# Patient Record
Sex: FEMALE | Race: OTHER | HISPANIC OR LATINO | URBAN - METROPOLITAN AREA
[De-identification: names, ages, dates, MRNs, and addresses within clinical notes are randomized per-mention and may not be internally consistent; named-entity substitution may affect disease eponyms.]

---

## 2024-10-13 ENCOUNTER — EMERGENCY (EMERGENCY)
Facility: HOSPITAL | Age: 24
LOS: 1 days | Discharge: HOME | End: 2024-10-13
Attending: EMERGENCY MEDICINE
Payer: SELF-PAY

## 2024-10-13 VITALS
WEIGHT: 182.1 LBS | DIASTOLIC BLOOD PRESSURE: 70 MMHG | TEMPERATURE: 99 F | SYSTOLIC BLOOD PRESSURE: 105 MMHG | HEART RATE: 75 BPM | HEIGHT: 63 IN | RESPIRATION RATE: 19 BRPM | OXYGEN SATURATION: 99 %

## 2024-10-13 VITALS
HEART RATE: 90 BPM | DIASTOLIC BLOOD PRESSURE: 68 MMHG | RESPIRATION RATE: 18 BRPM | TEMPERATURE: 99 F | SYSTOLIC BLOOD PRESSURE: 109 MMHG | OXYGEN SATURATION: 99 %

## 2024-10-13 LAB
ALBUMIN SERPL ELPH-MCNC: 3.2 G/DL — LOW (ref 3.5–5)
ALP SERPL-CCNC: 61 U/L — SIGNIFICANT CHANGE UP (ref 40–120)
ALT FLD-CCNC: 23 U/L DA — SIGNIFICANT CHANGE UP (ref 10–60)
ANION GAP SERPL CALC-SCNC: 8 MMOL/L — SIGNIFICANT CHANGE UP (ref 5–17)
AST SERPL-CCNC: 53 U/L — HIGH (ref 10–40)
BASOPHILS # BLD AUTO: 0.05 K/UL — SIGNIFICANT CHANGE UP (ref 0–0.2)
BASOPHILS NFR BLD AUTO: 0.4 % — SIGNIFICANT CHANGE UP (ref 0–2)
BILIRUB SERPL-MCNC: 0.1 MG/DL — LOW (ref 0.2–1.2)
BUN SERPL-MCNC: 6 MG/DL — LOW (ref 7–18)
CALCIUM SERPL-MCNC: 8.8 MG/DL — SIGNIFICANT CHANGE UP (ref 8.4–10.5)
CHLORIDE SERPL-SCNC: 108 MMOL/L — SIGNIFICANT CHANGE UP (ref 96–108)
CO2 SERPL-SCNC: 23 MMOL/L — SIGNIFICANT CHANGE UP (ref 22–31)
CREAT SERPL-MCNC: 0.49 MG/DL — LOW (ref 0.5–1.3)
EGFR: 135 ML/MIN/1.73M2 — SIGNIFICANT CHANGE UP
EOSINOPHIL # BLD AUTO: 0.06 K/UL — SIGNIFICANT CHANGE UP (ref 0–0.5)
EOSINOPHIL NFR BLD AUTO: 0.5 % — SIGNIFICANT CHANGE UP (ref 0–6)
GLUCOSE SERPL-MCNC: 93 MG/DL — SIGNIFICANT CHANGE UP (ref 70–99)
HCG SERPL-ACNC: 9961 MIU/ML — HIGH
HCT VFR BLD CALC: 34.3 % — LOW (ref 34.5–45)
HGB BLD-MCNC: 11.4 G/DL — LOW (ref 11.5–15.5)
IMM GRANULOCYTES NFR BLD AUTO: 0.4 % — SIGNIFICANT CHANGE UP (ref 0–0.9)
LYMPHOCYTES # BLD AUTO: 1.47 K/UL — SIGNIFICANT CHANGE UP (ref 1–3.3)
LYMPHOCYTES # BLD AUTO: 11.9 % — LOW (ref 13–44)
MAGNESIUM SERPL-MCNC: 1.7 MG/DL — SIGNIFICANT CHANGE UP (ref 1.6–2.6)
MCHC RBC-ENTMCNC: 29.5 PG — SIGNIFICANT CHANGE UP (ref 27–34)
MCHC RBC-ENTMCNC: 33.2 GM/DL — SIGNIFICANT CHANGE UP (ref 32–36)
MCV RBC AUTO: 88.9 FL — SIGNIFICANT CHANGE UP (ref 80–100)
MONOCYTES # BLD AUTO: 0.44 K/UL — SIGNIFICANT CHANGE UP (ref 0–0.9)
MONOCYTES NFR BLD AUTO: 3.6 % — SIGNIFICANT CHANGE UP (ref 2–14)
NEUTROPHILS # BLD AUTO: 10.26 K/UL — HIGH (ref 1.8–7.4)
NEUTROPHILS NFR BLD AUTO: 83.2 % — HIGH (ref 43–77)
NRBC # BLD: 0 /100 WBCS — SIGNIFICANT CHANGE UP (ref 0–0)
PLATELET # BLD AUTO: 271 K/UL — SIGNIFICANT CHANGE UP (ref 150–400)
POTASSIUM SERPL-MCNC: 3.7 MMOL/L — SIGNIFICANT CHANGE UP (ref 3.5–5.3)
POTASSIUM SERPL-SCNC: 3.7 MMOL/L — SIGNIFICANT CHANGE UP (ref 3.5–5.3)
PROT SERPL-MCNC: 7.2 G/DL — SIGNIFICANT CHANGE UP (ref 6–8.3)
RBC # BLD: 3.86 M/UL — SIGNIFICANT CHANGE UP (ref 3.8–5.2)
RBC # FLD: 12.7 % — SIGNIFICANT CHANGE UP (ref 10.3–14.5)
SODIUM SERPL-SCNC: 139 MMOL/L — SIGNIFICANT CHANGE UP (ref 135–145)
WBC # BLD: 12.33 K/UL — HIGH (ref 3.8–10.5)
WBC # FLD AUTO: 12.33 K/UL — HIGH (ref 3.8–10.5)

## 2024-10-13 PROCEDURE — 83735 ASSAY OF MAGNESIUM: CPT

## 2024-10-13 PROCEDURE — 80053 COMPREHEN METABOLIC PANEL: CPT

## 2024-10-13 PROCEDURE — 82962 GLUCOSE BLOOD TEST: CPT

## 2024-10-13 PROCEDURE — 85025 COMPLETE CBC W/AUTO DIFF WBC: CPT

## 2024-10-13 PROCEDURE — 93010 ELECTROCARDIOGRAM REPORT: CPT

## 2024-10-13 PROCEDURE — 36415 COLL VENOUS BLD VENIPUNCTURE: CPT

## 2024-10-13 PROCEDURE — 99285 EMERGENCY DEPT VISIT HI MDM: CPT | Mod: 25

## 2024-10-13 PROCEDURE — 99285 EMERGENCY DEPT VISIT HI MDM: CPT

## 2024-10-13 PROCEDURE — 76817 TRANSVAGINAL US OBSTETRIC: CPT | Mod: 26

## 2024-10-13 PROCEDURE — 93005 ELECTROCARDIOGRAM TRACING: CPT

## 2024-10-13 PROCEDURE — 76817 TRANSVAGINAL US OBSTETRIC: CPT

## 2024-10-13 PROCEDURE — 76805 OB US >/= 14 WKS SNGL FETUS: CPT

## 2024-10-13 PROCEDURE — 76805 OB US >/= 14 WKS SNGL FETUS: CPT | Mod: 26

## 2024-10-13 PROCEDURE — T1013: CPT

## 2024-10-13 PROCEDURE — 84702 CHORIONIC GONADOTROPIN TEST: CPT

## 2024-10-13 RX ORDER — SODIUM CHLORIDE 0.9 % (FLUSH) 0.9 %
1000 SYRINGE (ML) INJECTION ONCE
Refills: 0 | Status: COMPLETED | OUTPATIENT
Start: 2024-10-13 | End: 2024-10-13

## 2024-10-13 RX ADMIN — Medication 1000 MILLILITER(S): at 09:30

## 2024-10-13 NOTE — ED PROVIDER NOTE - PHYSICAL EXAMINATION
NEURO: Alert and awake A&Ox3  HEENT: No LAD or other abnormalities noted; conjunctiva are pink and moist  PULM: Clear to auscultation bilaterally  CV: Regular rate and rhythm no murmurs rubs or gallops noted; 2+ pulses in all 4 extremities; normal cap refill  ABD: Consistent with 6 weeks of pregnancy   MSK: No abnormalities noted  SKIN: no rashes or lesions noted   PSYCH: no cause for psychiatric concern

## 2024-10-13 NOTE — ED PROVIDER NOTE - OBJECTIVE STATEMENT
The patient is a 24y female, , with no significant PMHx who is currently 6 weeks pregnant presenting with her second episode within 12 days of lightheadedness, dizziness, and syncope. The patient reports that 12 days ago [place] was on the train and she suddenly felt dizzy and fainted and that she wasn't sure if she fell but when she woke up was in the arms of a stranger who helped her. She reports that this morning she went to work and felt she needed to go to the bathroom. She reports that she tried to go to the bathroom but when she couldn't she stood up and felt dizzy, lightheaded, and then fainted soon after. She reports that she doesn't believe she fell as a co-worker helped her and that the co-worker called EMS. She notes that she has been measuring her blood pressure at home and that it has been running a bit low at "112/68" and that when she was 6 or 7 months pregnant with her current 7 year old daughter, she went to a clinic and they measured her blood pressure and said it was "a little high" and that she actually fainted that same day soon after the appointment. She reports that she has never fainted outside of pregnancy. She also notes that she might have heard her co-worker tell EMS today that she "convulsed" but that she wasn't sure if she heard that correctly, or if that happened. She denies current dizziness, endorses a mild headache (but reports that she gets headaches sometimes), denies any muscle stiffness/soreness, urinary or stool incontinence, tongue bite, fevers, chill, recent sickness, and says she is up to date on her vaccines and taking only a prenatal vitamin.

## 2024-10-13 NOTE — ED PROVIDER NOTE - ATTENDING CONTRIBUTION TO CARE
Agree with medical student H&P.  24y female, , LMP 6/15 with no significant PMHx                 is currently 6 weeks pregnant presenting with her second episode within 12 days of lightheadedness, dizziness, and syncope. The patient reports that 12 days ago she was on the train and she suddenly felt dizzy and fainted and that she wasn't sure if she fell but when she woke up was in the arms of a stranger who helped her. She reports that this morning she went to work and felt she needed to go to the bathroom. She reports that she tried to go to the bathroom but when she couldn't she stood up and felt dizzy, lightheaded, and then fainted soon after. She reports that she doesn't believe she fell as a co-worker helped her and that the co-worker called EMS. She notes that she has been measuring her blood pressure at home and that it has been running a bit low at "112/68" and that when she was 6 or 7 months pregnant with her current 7 year old daughter, she went to a clinic and they measured her blood pressure and said it was "a little high" and that she actually fainted that same day soon after the appointment. She reports that she has never fainted outside of pregnancy. She also notes that she might have heard her co-worker tell EMS today that she "convulsed" but that she wasn't sure if she heard that correctly, or if that happened. She denies current dizziness, endorses a mild headache (but reports that she gets headaches sometimes), denies any muscle stiffness/soreness, urinary or stool incontinence, tongue bite, fevers, chill, recent sickness, and says she is up to date on her vaccines and taking only a prenatal vitamin. Agree with medical student H&P.  24y female, , LMP 6/15 with no significant PMHx Presents to ED following a syncopal episode.  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  As per patient she did not eat breakfast this morning.   Patient denies head trauma saying she was caught by a coworker.  no urinary fecal incontinence, no lip or tongue biting.   Currently patient reporting some mild abdominal pain.  Patient denies any vaginal bleeding.  Exam unremarkable.  Will get labs, fluids, pelvic sono, reassess Agree with medical student H&P.  24y female, , LMP 6/15 with no significant PMHx Presents to ED following a syncopal episode.  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  As per patient she did not eat breakfast this morning.   Patient denies head trauma saying she was caught by a coworker.  no urinary fecal incontinence, no lip or tongue biting.   Currently patient reporting some mild abdominal pain.  Patient denies any vaginal bleeding.  Exam unremarkable.  Will get labs, fluids, pelvic sono, reassess    Patient [place]ian-speaking  #[id number]

## 2024-10-13 NOTE — ED ADULT NURSE NOTE - OBJECTIVE STATEMENT
Pt AOx4, ambulatory, 16 weeks pregnant, c/o syncopal episode at work. Pt denies head injury, CP, SOB, abdominal pain, or bleeding. EKG done, pt placed on cardiac monitor, no signs of distress noted.

## 2024-10-13 NOTE — ED ADULT NURSE NOTE - NSFALLHARMRISKINTERV_ED_ALL_ED

## 2024-10-13 NOTE — ED PROVIDER NOTE - NSFOLLOWUPINSTRUCTIONS_ED_ALL_ED_FT
Síncope en los adultos  Syncope, Adult  Outline of the head showing blood vessels that supply the brain.  El síncope hace referencia a jeannine afección en la cual jeannine persona pierde la conciencia temporalmente. También se lo puede conocer cb desmayo. La causa del síncope es jeannine disminución súbita del flujo de kina al cerebro. Puede suceder por diversos motivos.    La mayoría de las causas del síncope no son peligrosas. Puede desencadenarse por cosas tales cb pinchazos de agujas, fernando kina, sentir dolor o emociones intensas. Sin embargo, el síncope también puede ser un signo de un problema médico grave, cb jeannine anomalía cardíaca. Otras causas pueden ser la deshidratación, las migrañas o trevon medicamentos que bajan la presión arterial. El médico puede hacerle estudios para encontrar el motivo por el cual tiene un síncope.    Ante un desmayo, obtenga ayuda médica de inmediato. Comuníquese con el servicio de emergencias de muniz localidad (911 en los [place]).    Siga estas indicaciones en muniz casa:  Esté atento a cualquier cambio en los síntomas. Para mantener muniz seguridad y ayudar a aliviar alicja síntomas, tome estas medidas:    Cómo saber cuándo puede estar a punto de desmayarse    Los signos de que alguien está por desmayarse incluyen lo siguiente:  Sentirse mareado, débil, aturdido o cb si la habitación estuviera girando.  Sentir náuseas.  Fernando manchas o fernando todo swanson o todo ambrocio en el [place] de visión.  Tener la piel fría y húmeda o sentir calor y sudar.  Tener un zumbido en los oídos (acúfenos).  Si comienza a sentir que podría desmayarse, siéntese o recuéstese inmediatamente. Si se sienta, baje la juan y colóquela entre las piernas. Si se recuesta, levante (eleve) los pies por encima del nivel del corazón.  Respire profundamente y de manera continua. Espere hasta que los síntomas hayan desaparecido.  Pídale a alguien que se quede con usted hasta que se sienta estable.  Medicamentos    Use los medicamentos de venta pippa y los recetados solamente cb se lo haya indicado el médico.  Si está tomando medicamentos para la presión arterial o para el corazón, póngase de pie lentamente, tómese algunos minutos para permanecer sentado y luego párese. Brenas puede reducir los mareos y el riesgo de tener un síncope.  Estilo de jerri    No conduzca vehículos, no use maquinarias ni practique deportes hasta que el médico lo autorice.  No brandon alcohol.  No consuma ningún producto que contenga nicotina o tabaco. Estos productos incluyen cigarrillos, tabaco para mascar y aparatos de vapeo, cb los cigarrillos electrónicos. Si necesita ayuda para dejar de consumir estos productos, consulte al médico.  Evite saunas y bañeras de hidromasajes.  Indicaciones generales    [place] con el médico acerca de alicja síntomas. Es posible que deba realizarse estudios para entender la causa del síncope.  Brandon suficiente líquido cb para mantener la orina de color amarillo pálido.  Evite permanecer de pie mucho tiempo. Si debe permanecer de pie peyton mucho tiempo, realice movimientos cb los siguientes:   las piernas.  Cruzar las piernas.  Flexionar y estirar los músculos de las piernas.  Ponerse en cuclillas.  Concurra a todas las visitas de seguimiento. Brenas es importante.  Comuníquese con un médico si:  Tiene episodios cb si fuera a desmayarse.  Solicite ayuda de inmediato si:  Se desmaya.  Se golpea la juan o se lesiona después de desmayarse.  Tiene cualquiera de estos síntomas que pueden indicar problemas en el corazón:  Latidos cardíacos acelerados o irregulares (palpitaciones).  Dolor fuera de lo normal en el pecho, el abdomen o la espalda.  Falta de aire.  Tiene jeannine convulsión.  Siente un dolor de juan intenso.  Se siente confundido.  Tiene problemas de visión.  Tiene debilidad intensa o dificultad para caminar.  Sangra por la boca o el recto, o alicja heces son de color ambrocio o aspecto alquitranado.  Estos síntomas pueden representar un problema grave que constituye jeannine emergencia. No espere hasta que los síntomas desaparezcan. Solicite atención médica de inmediato. Comuníquese con el servicio de emergencias de muniz localidad (911 en los [place]). No conduzca por alicja propios medios hasta el hospital.    Resumen  El síncope hace referencia a jeannine afección en la cual jeannine persona pierde la conciencia temporalmente. También se lo puede conocer cb desmayo. La causa del síncope es jeannine disminución súbita del flujo de kina al cerebro.  Los signos de que puede estar por desmayarse incluyen mareos, sensación de desvanecimiento, náuseas, cambios repentinos en la visión o piel fría y húmeda.  Aunque la mayoría de las causas no implican un peligro, el síncope puede ser un signo de un problema médico grave. Si se desmaya, solicite ayuda de inmediato.  Si comienza a sentir que podría desmayarse, siéntese o recuéstese inmediatamente. Si se sienta, baje la juan y colóquela entre las piernas. Si se recuesta, levante (eleve) los pies por encima del nivel del corazón.  Esta información no tiene cb fin reemplazar el consejo del médico. Asegúrese de hacerle al médico cualquier pregunta que tenga.    Document Revised: 05/20/2022 Document Reviewed: 05/20/2022  [employer] Patient Education © 2024 [employer]

## 2024-10-13 NOTE — ED PROVIDER NOTE - CLINICAL SUMMARY MEDICAL DECISION MAKING FREE TEXT BOX
The patient is a 24y female, , with no significant PMHx who is currently 6 weeks pregnant presenting with her second episode within 12 days of lightheadedness, dizziness, and syncope. The most likely diagnosis is vasovagal syncope vs syncope related to hormonal changes of pregnancy vs seizure disorder.     Plan:   Assess fetal status (US)  Administer IV fluids (1L NS)  Provide reassurance   Obtain CBC/CMP

## 2024-10-13 NOTE — ED PROVIDER NOTE - PROGRESS NOTE DETAILS
Reassessed and reports feeling better.  Labs unremarkable.  Positive IUP with positive FHR on sono.  All results reviewed with patient.  Will DC

## 2024-10-13 NOTE — ED PROVIDER NOTE - PATIENT PORTAL LINK FT
You can access the FollowMyHealth Patient Portal offered by [facility] by registering at the following website: [email or web address]. By joining [facility]’s FollowMyHealth portal, you will also be able to view your health information using other applications (apps) compatible with our system.